# Patient Record
Sex: MALE | Race: OTHER | URBAN - METROPOLITAN AREA
[De-identification: names, ages, dates, MRNs, and addresses within clinical notes are randomized per-mention and may not be internally consistent; named-entity substitution may affect disease eponyms.]

---

## 2024-05-15 ENCOUNTER — EMERGENCY (EMERGENCY)
Facility: HOSPITAL | Age: 59
LOS: 1 days | Discharge: HOME | End: 2024-05-15
Attending: EMERGENCY MEDICINE | Admitting: EMERGENCY MEDICINE
Payer: COMMERCIAL

## 2024-05-15 ENCOUNTER — APPOINTMENT (OUTPATIENT)
Dept: INTERNAL MEDICINE | Facility: CLINIC | Age: 59
End: 2024-05-15

## 2024-05-15 VITALS
HEART RATE: 68 BPM | RESPIRATION RATE: 18 BRPM | TEMPERATURE: 98 F | SYSTOLIC BLOOD PRESSURE: 124 MMHG | OXYGEN SATURATION: 100 % | DIASTOLIC BLOOD PRESSURE: 78 MMHG

## 2024-05-15 VITALS
RESPIRATION RATE: 18 BRPM | DIASTOLIC BLOOD PRESSURE: 84 MMHG | OXYGEN SATURATION: 97 % | TEMPERATURE: 99 F | SYSTOLIC BLOOD PRESSURE: 133 MMHG | HEART RATE: 81 BPM

## 2024-05-15 LAB
ALBUMIN SERPL ELPH-MCNC: 3.8 G/DL — SIGNIFICANT CHANGE UP (ref 3.3–5)
ALP SERPL-CCNC: 86 U/L — SIGNIFICANT CHANGE UP (ref 40–120)
ALT FLD-CCNC: 43 U/L — HIGH (ref 4–41)
ANION GAP SERPL CALC-SCNC: 17 MMOL/L — HIGH (ref 7–14)
AST SERPL-CCNC: 42 U/L — HIGH (ref 4–40)
BASOPHILS # BLD AUTO: 0.02 K/UL — SIGNIFICANT CHANGE UP (ref 0–0.2)
BASOPHILS NFR BLD AUTO: 0.3 % — SIGNIFICANT CHANGE UP (ref 0–2)
BILIRUB SERPL-MCNC: 0.4 MG/DL — SIGNIFICANT CHANGE UP (ref 0.2–1.2)
BUN SERPL-MCNC: 16 MG/DL — SIGNIFICANT CHANGE UP (ref 7–23)
CALCIUM SERPL-MCNC: 9.8 MG/DL — SIGNIFICANT CHANGE UP (ref 8.4–10.5)
CHLORIDE SERPL-SCNC: 101 MMOL/L — SIGNIFICANT CHANGE UP (ref 98–107)
CO2 SERPL-SCNC: 19 MMOL/L — LOW (ref 22–31)
CREAT SERPL-MCNC: 0.8 MG/DL — SIGNIFICANT CHANGE UP (ref 0.5–1.3)
CRP SERPL-MCNC: 102.8 MG/L — HIGH
EGFR: 104 ML/MIN/1.73M2 — SIGNIFICANT CHANGE UP
EOSINOPHIL # BLD AUTO: 0.06 K/UL — SIGNIFICANT CHANGE UP (ref 0–0.5)
EOSINOPHIL NFR BLD AUTO: 0.8 % — SIGNIFICANT CHANGE UP (ref 0–6)
ERYTHROCYTE [SEDIMENTATION RATE] IN BLOOD: 96 MM/HR — HIGH (ref 1–15)
GLUCOSE SERPL-MCNC: 117 MG/DL — HIGH (ref 70–99)
HCT VFR BLD CALC: 37.4 % — LOW (ref 39–50)
HGB BLD-MCNC: 13 G/DL — SIGNIFICANT CHANGE UP (ref 13–17)
IANC: 5.14 K/UL — SIGNIFICANT CHANGE UP (ref 1.8–7.4)
IMM GRANULOCYTES NFR BLD AUTO: 0.1 % — SIGNIFICANT CHANGE UP (ref 0–0.9)
LYMPHOCYTES # BLD AUTO: 1.5 K/UL — SIGNIFICANT CHANGE UP (ref 1–3.3)
LYMPHOCYTES # BLD AUTO: 20.7 % — SIGNIFICANT CHANGE UP (ref 13–44)
MCHC RBC-ENTMCNC: 31.9 PG — SIGNIFICANT CHANGE UP (ref 27–34)
MCHC RBC-ENTMCNC: 34.8 GM/DL — SIGNIFICANT CHANGE UP (ref 32–36)
MCV RBC AUTO: 91.9 FL — SIGNIFICANT CHANGE UP (ref 80–100)
MONOCYTES # BLD AUTO: 0.52 K/UL — SIGNIFICANT CHANGE UP (ref 0–0.9)
MONOCYTES NFR BLD AUTO: 7.2 % — SIGNIFICANT CHANGE UP (ref 2–14)
NEUTROPHILS # BLD AUTO: 5.14 K/UL — SIGNIFICANT CHANGE UP (ref 1.8–7.4)
NEUTROPHILS NFR BLD AUTO: 70.9 % — SIGNIFICANT CHANGE UP (ref 43–77)
NRBC # BLD: 0 /100 WBCS — SIGNIFICANT CHANGE UP (ref 0–0)
NRBC # FLD: 0 K/UL — SIGNIFICANT CHANGE UP (ref 0–0)
PLATELET # BLD AUTO: 299 K/UL — SIGNIFICANT CHANGE UP (ref 150–400)
POTASSIUM SERPL-MCNC: 4.1 MMOL/L — SIGNIFICANT CHANGE UP (ref 3.5–5.3)
POTASSIUM SERPL-SCNC: 4.1 MMOL/L — SIGNIFICANT CHANGE UP (ref 3.5–5.3)
PROT SERPL-MCNC: 8 G/DL — SIGNIFICANT CHANGE UP (ref 6–8.3)
RBC # BLD: 4.07 M/UL — LOW (ref 4.2–5.8)
RBC # FLD: 11.7 % — SIGNIFICANT CHANGE UP (ref 10.3–14.5)
SODIUM SERPL-SCNC: 137 MMOL/L — SIGNIFICANT CHANGE UP (ref 135–145)
URATE SERPL-MCNC: 6.3 MG/DL — SIGNIFICANT CHANGE UP (ref 3.4–8.8)
WBC # BLD: 7.25 K/UL — SIGNIFICANT CHANGE UP (ref 3.8–10.5)
WBC # FLD AUTO: 7.25 K/UL — SIGNIFICANT CHANGE UP (ref 3.8–10.5)

## 2024-05-15 PROCEDURE — 73600 X-RAY EXAM OF ANKLE: CPT | Mod: 26,RT

## 2024-05-15 PROCEDURE — 99285 EMERGENCY DEPT VISIT HI MDM: CPT

## 2024-05-15 PROCEDURE — 70498 CT ANGIOGRAPHY NECK: CPT | Mod: 26,MC

## 2024-05-15 PROCEDURE — 70496 CT ANGIOGRAPHY HEAD: CPT | Mod: 26,MC

## 2024-05-15 PROCEDURE — 71046 X-RAY EXAM CHEST 2 VIEWS: CPT | Mod: 26

## 2024-05-15 RX ORDER — IBUPROFEN 200 MG
400 TABLET ORAL ONCE
Refills: 0 | Status: COMPLETED | OUTPATIENT
Start: 2024-05-15 | End: 2024-05-15

## 2024-05-15 RX ADMIN — Medication 400 MILLIGRAM(S): at 16:22

## 2024-05-15 RX ADMIN — Medication 40 MILLIGRAM(S): at 18:59

## 2024-05-15 NOTE — ED ADULT NURSE NOTE - NSFALLASSESSNEED_ED_ALL_ED
----- Message from Lakshmi Phillip MA sent at 8/31/2023  2:28 PM CDT -----  Regarding: FW: Repatha  FYI  ----- Message -----  From: Mita Bennett Chi, PharmD  Sent: 8/31/2023  10:46 AM CDT  To: Valentin CHAWLA Staff  Subject: Repatha                                          OSP unable to contact patient for Repatha refills and closing enrollment.    Mita Aguilar  Clinical Pharmacist  [facility]  Phone: [phone]  Fax: [phone]  Hours: Monday - Friday 8:30 - 5:00 PM         no

## 2024-05-15 NOTE — ED PROVIDER NOTE - ATTENDING CONTRIBUTION TO CARE
Gong: I have seen and examined the patient face to face, have reviewed and addended the HPI, PE and a/p as necessary.     57 yo M with no PMH, has not seen a doctor in years a/w right ankle swelling.  Noted to have R ankle swelling is progressed over the past few days. Pt also reprots having bilateral knee pain.  No trauma to any of these areas.  Endorsing redness to lateral aspect of ankle.  Denies any bug bites.  States he thinks he might of had gout in the past. Of note on ROS patient noted to have increasing weakness and loss of mm mass of R fore arm and L quadriceps mm.  Pt reports weight loss of 20lbs over the past few years.  Of note on ROS, patient reports having 1 week of numbness and cramping of R hand 2 weeks ago.   Denies any fevers, chills, chest pain, shortness of breath, nausea vomiting diarrhea.     GEN - NAD; well appearing; A+O x3; non-toxic appearing  CARD -s1s2, RRR, no M,G,R;   PULM - CTA b/l, symmetric breath sounds;   ABD -  +BS, ND, NT, soft, no guarding, no rebound, no masses;   BACK - no CVA tenderness, Normal  spine;   EXT - symmetric pulses, 2+ dp, capillary refill < 2 seconds, no cyanosis, no edema; RUE forearm with loss of mm mass; R ankle eythematous on the lateral mall with swelling and unable to bear weight.  L quadriceps with loss of mm mass  NEURO - R hand with decreased  strength     57 yo M with no PMH, has not seen a doctor in years a/w right ankle swelling. Concern for possible gout flare however, cannot explain loss of mm mass in quadriceps and R forearm.  Will obtain ct head to eval for possible ischemic condition, may require further testing,  Neurology and podiatry consults.  Dispo possible admission for further work up.

## 2024-05-15 NOTE — ED PROVIDER NOTE - CLINICAL SUMMARY MEDICAL DECISION MAKING FREE TEXT BOX
58year-old male no significant past medical history presenting with right ankle swelling.  Patient states swelling is progressed over the past few days.  Also endorsing bilateral knee pain.  No trauma to any of these areas.  Endorsing redness to lateral aspect of ankle.  Denies any bug bites.  States he thinks he might of had gout in the past.  Denies any fevers, chills, chest pain, shortness of breath, nausea vomiting diarrhea.  Endorsing subjective weight loss and to 2006. States he has never seen a primary care physician before  Plan for basic labs including CBC CMP ESR CRP uric acid level  Differential diagnose includes but not limited to gout versus pseudogout versus cellulitis.  Low suspicion for septic joint as patient has full range of motion and is afebrile

## 2024-05-15 NOTE — ED PROVIDER NOTE - OBJECTIVE STATEMENT
EXTR-year-old male no significant past medical history presenting with right ankle swelling.  Patient states swelling is progressed over the past few days.  Also endorsing bilateral knee pain.  No trauma to any of these areas.  Endorsing redness to lateral aspect of ankle.  Denies any bug bites.  States he thinks he might of had gout in the past.  Denies any fevers, chills, chest pain, shortness of breath, nausea vomiting diarrhea.  Endorsing subjective weight loss and to 2006. States he has never seen a primary care physician before 58-year-old male no significant past medical history presenting with right ankle swelling.  Patient states swelling is progressed over the past few days.  Also endorsing bilateral knee pain.  No trauma to any of these areas.  Endorsing redness to lateral aspect of ankle. Endorsing intermittent numbness to bilateral hands and weakness with  strength. Denies any bug bites.  States he thinks he might of had gout in the past.  Denies any fevers, chills, chest pain, shortness of breath, nausea vomiting diarrhea.  Endorsing subjective weight loss and to 2006. States he has never seen a primary care physician before

## 2024-05-15 NOTE — ED PROVIDER NOTE - CARE PLAN
1 Principal Discharge DX:	Ankle pain  Secondary Diagnosis:	Muscle atrophy   Principal Discharge DX:	Muscle atrophy  Secondary Diagnosis:	Gout  Secondary Diagnosis:	Ankle pain

## 2024-05-15 NOTE — ED PROVIDER NOTE - CARE PROVIDER_API CALL
Heather Nunez [place]  Neurology  [address] [place] [address]  Phone: [phone]  Fax: [phone]  Follow Up Time:    Heather Nunez  Neurology  [address] [facility], [facility] [address]  [place] [address]  Phone: [phone]  Fax: [phone]  Follow Up Time:     Mac Shelton  Podiatric Medicine and Surgery  [address] [place] [id number]  Phone: [phone]  Fax: [phone]  Follow Up Time:

## 2024-05-15 NOTE — ED ADULT TRIAGE NOTE - CHIEF COMPLAINT QUOTE
Pt. c/o right ankle pain, swelling and redness since Friday. Also c/o bilateral knee pain. Denies injury. States he's been losing a lot of weight.

## 2024-05-15 NOTE — CONSULT NOTE ADULT - ASSESSMENT
58y M with Hx gout, presenting w/ CC of R ankle pain. Neurology consulted for areas of muscle atrophy    Impression: episodes of R wrist and L knee swelling, followed by muscle atrophy. DDx mononeuropathy multiplex, spinal nerve impingement    Recommendations:   [] f/u CTA head/neck w/ contrast  [] outpatient EMG and NCS  [] follow-up with Dr Pitts in 2-4 weeks ([phone])    Case discussed w/ attending Dr Nunez 58y M with Hx gout, presenting w/ CC of R ankle pain. Neurology consulted for areas of muscle atrophy    Impression: episodes of R wrist and L knee swelling, followed by muscle atrophy. DDx mononeuropathy multiplex, spinal nerve impingement    Recommendations:   [] outpatient EMG and NCS  [] follow-up with Dr Pitts in 1 week ([phone])    Case discussed w/ attending Dr Nunez    Attending Attestation:  The patient left the hospital prior to being seen by me.

## 2024-05-15 NOTE — ED PROVIDER NOTE - CARE PROVIDERS DIRECT ADDRESSES
[email or web address]

## 2024-05-15 NOTE — ED PROVIDER NOTE - PROVIDER TOKENS
PROVIDER:[TOKEN:[[id number]]] PROVIDER:[TOKEN:[[id number]]],PROVIDER:[TOKEN:[[id number]]]

## 2024-05-15 NOTE — ED PROVIDER NOTE - PHYSICAL EXAMINATION
Jean Garrett DO (PGY2)   Physical Exam:    Gen: NAD, AOx3  Head: NCAT  HEENT: EOMI, PEERLA  Lung: CTAB, no respiratory distress, no wheezes/rhonchi/rales B/L  CV: RRR, no murmurs, rubs or gallops  Abd: soft, NT, ND, no guarding, no rigidity, no rebound tenderness, no CVA tenderness   MSK: Right ankle with overlying ecchymosis to lateral aspect, with tenderness to palpation.  +2 pulses with full range of motion of right foot and right ankle.  Sensation intact to light touch bilateral lower extremities.  No tenderness to palpation or swelling to bilateral knees.  Left ankle and foot within normal limits  Neuro: No focal sensory or motor deficits. Sensation intact to light touch all extremities.  Skin: Warm, well perfused, no rash, no leg swelling  Psych: normal affect, calm Jean Garrett DO (PGY2)   Physical Exam:    Gen: NAD, AOx3  Head: NCAT  HEENT: EOMI, PEERLA  Lung: CTAB, no respiratory distress, no wheezes/rhonchi/rales B/L  CV: RRR, no murmurs, rubs or gallops  Abd: soft, NT, ND, no guarding, no rigidity, no rebound tenderness, no CVA tenderness   MSK: Right ankle with overlying ecchymosis to lateral aspect, with tenderness to palpation.  +2 pulses with full range of motion of right foot and right ankle.  Sensation intact to light touch bilateral lower extremities.  No tenderness to palpation or swelling to bilateral knees.  Left ankle and foot within normal limits  Neuro: Patient with 3 out of 5  strength right upper extremity, 5 out of 5  strength left upper extremity.    Evidence of muscle wasting to right forearm with  as well as left thigh wasting with extension  Cranial Nerves:  --CN II: PERRL 3mm  --CN III, IV, VI: EOMI bilateral no nystagmus  --CN V1-3: Facial sensation intact to touch  --CN VII: No facial asymmetry or droop  --CN VIII: Hearing intact to rubbing fingers b/l  --CN IX, X: Palate elevates symmetrically. Normal phonation  --CN XI: Heading turning and shoulder shrug intact b/l  --CN XII: Tongue midline with normal movements  Skin: Warm, well perfused

## 2024-05-15 NOTE — ED ADULT NURSE NOTE - OBJECTIVE STATEMENT
Attempted to call patient to follow up on bp readings. Left voicemail.     c/o R ankle pain/swelling x 5 days. denies injury/trauma. endorses recent unplanned weightloss and generalized body/joint pain. denies cp/sob. denies n/v/d/headache/fever/chills. RR even and unlabored, speaking in complete sentences, aao x 3. will cont to monitor.

## 2024-05-15 NOTE — CONSULT NOTE ADULT - ASSESSMENT
58M presents with right ankle swelling and pain   - Patient seen and evaluated  - Afebrile, No leukocytosis, ESR 96, CRP 10.28, Uric Acid 6.3  - No open lesion bilaterally. Erythema localized to right lateral ankle with no fluctuance, no increased warmth. Pain upon palpation of right lateral ankle, decreased strength of foot evertors 4/5 bilaterally, ability to fully range ankle joint bilaterally   - Right ankle xray: no OM, no gas, mild soft tissue swelling   - Low concern for septic joint as ability to fully range motion of ankle with mild pain   - Recommend IV prednisone   - Recommend Medrol dose pack upon discharge   - Patient to follow up within 1 week with Dr. Shelton ([phone])  - Discussed with attending

## 2024-05-15 NOTE — CONSULT NOTE ADULT - SUBJECTIVE AND OBJECTIVE BOX
Patient is a 58y old  Male who presents with a chief complaint of gout (15 May 2024 18:03)      HPI:  58 year-old male past medical history of HTN, HLD, and Alport syndrome presenting with right ankle swelling.  Patient states swelling is progressed over the past few days.  Also endorsing bilateral knee pain.  No trauma to any of these areas.  Endorsing redness to lateral aspect of ankle.  Denies any bug bites.  States he thinks he might of had gout in the past.  Denies any fevers, chills, chest pain, shortness of breath, nausea vomiting diarrhea.  Endorsing subjective weight loss and to 2006. States he has never seen a primary care physician before    PAST MEDICAL & SURGICAL HISTORY:      MEDICATIONS  (STANDING):  methylPREDNISolone sodium succinate Injectable 40 milliGRAM(s) IV Push Once    MEDICATIONS  (PRN):      Allergies    No Known Allergies    Intolerances        VITALS:    Vital Signs Last 24 Hrs  T(C): 36.7 (15 May 2024 18:31), Max: 37.1 (15 May 2024 15:38)  T(F): 98 (15 May 2024 18:31), Max: 98.7 (15 May 2024 15:38)  HR: 68 (15 May 2024 18:31) (68 - 81)  BP: 124/78 (15 May 2024 18:31) (124/78 - 133/84)  BP(mean): --  RR: 18 (15 May 2024 18:31) (18 - 18)  SpO2: 100% (15 May 2024 18:31) (97% - 100%)    Parameters below as of 15 May 2024 18:31  Patient On (Oxygen Delivery Method): room air        LABS:                          13.0   7.25  )-----------( 299      ( 15 May 2024 16:20 )             37.4       05-15    137  |  101  |  16  ----------------------------<  117<H>  4.1   |  19<L>  |  0.80    Ca    9.8      15 May 2024 16:20    TPro  8.0  /  Alb  3.8  /  TBili  0.4  /  DBili  x   /  AST  42<H>  /  ALT  43<H>  /  AlkPhos  86  05-15      CAPILLARY BLOOD GLUCOSE              LOWER EXTREMITY PHYSICAL EXAM:    Vascular: DP/PT 2/4, B/L, CFT <3 seconds B/L, Temperature gradient warm to cool, B/L.   Neuro: Epicritic sensation diminished to the level of digits, B/L.  Musculoskeletal/Ortho: pain upon palpation of right lateral ankle, decreased strength of foot evertors 4/5 bilaterally, ability to fully range ankle joint bilaterally   Skin: No open lesion bilaterally. Erythema localized to right lateral ankle with no fluctuance, no increased warmth      RADIOLOGY & ADDITIONAL STUDIES:    < from: Xray Ankle 2 Views, Right (05.15.24 @ 17:29) >    ACC: [id number] EXAM:  XR ANKLE 2 VIEWS RT   ORDERED BY: ELANA CAT     PROCEDURE DATE:  05/15/2024          INTERPRETATION:  CLINICAL INDICATION: right ankle pain and swelling    EXAM:  Frontal oblique lateral right ankle from 5/15/2024 at 1729. No similar   prior studies available for comparison.    IMPRESSION:  Mild soft tissue swelling. No tracking soft tissue gas collections,   radiopaque foreign bodies, or gross radiographic evidence for   osteomyelitis.    No fractures or dislocations.    Congruent ankle mortise with smooth and intact talar dome. Preserved   remaining visualized joint spaces and no joint margin erosions.    Tiny calcaneal enthesophytes.    No lytic or blastic lesions.    --- End of Report ---            ROBINSON STARKS MD; Attending Radiologist  This document has been electronically signed. May 15 2024  5:34PM    < end of copied text >  
Neurology - Consult Note    -  Spectra: [id number] ([facility]), [phone] ([facility])  -    HPI: Patient INDRA SIBLEY is a 58y (1965) man with a PMHx significant for gout, who presents w/ CC of R ankle pain. Sister on phone providing [place] translation as needed. 4 weeks ago, he had swelling of the R wrist and inability to move the R hand for 2 weeks. He thinks he was using the RUE less during that time. He noticed afterwards muscle loss in the R forearm. 1 week ago, he noticed swelling in the L knee, followed by muscle loss in the L thigh. 5 days ago, he noticed swelling, redness, and pain of the R ankle. He denies any blurry vision, diplopia, slurred speech, dysphagia, SOB. No focal numbness. No family history of neurological disease. He describes a prior history of R hand weakness about 2 years ago that lasted for 1 week and self-resolved. He denies neck or back pain.      Review of Systems:     CONSTITUTIONAL: No fevers or chills  EYES AND ENT: No visual changes or no throat pain   NECK: No pain or stiffness  RESPIRATORY: No hemoptysis or shortness of breath  CARDIOVASCULAR: No chest pain or palpitations  GASTROINTESTINAL: No melena or hematochezia  GENITOURINARY: No dysuria or hematuria  NEUROLOGICAL: +As stated in HPI above  SKIN: No itching, burning, rashes, or lesions   All other review of systems is negative unless indicated above.    Allergies:  No Known Allergies      PMHx/PSHx/Family Hx: As above, otherwise see below       Social Hx:  No current use of tobacco, alcohol, or illicit drugs    Medications:  MEDICATIONS  (STANDING):    MEDICATIONS  (PRN):      Vitals:  T(C): 37.1 (05-15-24 @ 15:38), Max: 37.1 (05-15-24 @ 15:38)  HR: 81 (05-15-24 @ 15:38) (81 - 81)  BP: 133/84 (05-15-24 @ 15:38) (133/84 - 133/84)  RR: 18 (05-15-24 @ 15:38) (18 - 18)  SpO2: 97% (05-15-24 @ 15:38) (97% - 97%)    Physical Examination:   General - NAD, pleasant cooperative male  Cardiovascular - Peripheral pulses palpable, no edema  Eyes - Fundoscopy not performed due to safety precautions in the setting of the COVID-19 pandemic    Neurologic Exam:  Mental status - Awake, Alert, Oriented to person. Speech fluent. Follows simple and complex commands. Attention/concentration, recent and remote memory (including registration and recall), and fund of knowledge intact    Cranial nerves - PERRLA, VFF, EOMI, face sensation (V1-V3) intact b/l, facial strength intact without asymmetry b/l, hearing intact b/l, palate with symmetric elevation, trapezius 5/5 strength b/l, tongue midline on protrusion    Motor - Normal tone throughout. 5/5 strength in b/l deltoids, biceps, triceps, hand , finger flexors/abductors, hip flexion, knee extension, ankle dorsi/plantarflexion. Muscle atrophy in the R forearm and L thigh    Sensation - Light touch intact throughout    DTR's -             Biceps      Triceps     Brachioradialis      Patellar    Ankle    Toes/plantar response  R             2+             2+                  2+                       0            0                 Down  L              2+             2+                 2+                        0           0                 Down    Coordination - No tremors appreciated    Gait and station - did not assess due to R ankle pain    Labs:                        13.0   7.25  )-----------( 299      ( 15 May 2024 16:20 )             37.4     05-15    137  |  101  |  16  ----------------------------<  117<H>  4.1   |  19<L>  |  0.80    Ca    9.8      15 May 2024 16:20    TPro  8.0  /  Alb  3.8  /  TBili  0.4  /  DBili  x   /  AST  42<H>  /  ALT  43<H>  /  AlkPhos  86  05-15    CAPILLARY BLOOD GLUCOSE        LIVER FUNCTIONS - ( 15 May 2024 16:20 )  Alb: 3.8 g/dL / Pro: 8.0 g/dL / ALK PHOS: 86 U/L / ALT: 43 U/L / AST: 42 U/L / GGT: x                           Radiology:  imaging pending

## 2024-05-15 NOTE — ED PROVIDER NOTE - NSFOLLOWUPINSTRUCTIONS_ED_ALL_ED_FT
- You were seen in the emergency department today for Ankle pain  You were sent steroids to the pharmacy, please take as directed  Please follow-up with the neurology doctor as well as a podiatrist.  You will be given the numbers for both    - Lab and imaging results, if performed, were discussed with you along with your discharge diagnosis    - Follow up with your doctor in 1 week - bring copies of your results if you were given. If you are supposed  to follow up with a specialist, please bring your results with you as well.     - Return to the ED for any new, worsening, or concerning symptoms to you    - Continue all prescribed medications.    - Take ibuprofen/tylenol as directed as needed for pain.     - Rest and keep yourself hydrated with fluids. - You were seen in the emergency department today for Ankle pain  You were sent steroids to the pharmacy, please take as directed  Please follow-up with the neurology doctor as well as a podiatrist.  You will be given the numbers for both    Please follow up with the podiatrist: Call to make an appointment   Dr. Shelton ([phone])    - Lab and imaging results, if performed, were discussed with you along with your discharge diagnosis    - Follow up with your doctor in 1 week - bring copies of your results if you were given. If you are supposed  to follow up with a specialist, please bring your results with you as well.     - Return to the ED for any new, worsening, or concerning symptoms to you    - Continue all prescribed medications.    - Take ibuprofen/tylenol as directed as needed for pain.     - Rest and keep yourself hydrated with fluids. - You were seen in the emergency department today for Ankle pain  You were sent steroids to the pharmacy, please take as directed  Please follow-up with the neurology doctor as well as a podiatrist.  You will be given the numbers for both    Neurology: Dr Flaco Pitts  [address] [facility] [address] [facility] [id number] [phone]    Please follow up with the podiatrist: Call to make an appointment   Dr. Shelton ([phone])  Podiatric Medicine and Surgery  [address] [place] [phone]    - Lab and imaging results, if performed, were discussed with you along with your discharge diagnosis    - Follow up with your doctor in 1 week - bring copies of your results if you were given. If you are supposed  to follow up with a specialist, please bring your results with you as well.     - Return to the ED for any new, worsening, or concerning symptoms to you    - Continue all prescribed medications.    - Take ibuprofen/tylenol as directed as needed for pain.     - Rest and keep yourself hydrated with fluids.

## 2024-05-15 NOTE — ED ADULT NURSE REASSESSMENT NOTE - GENERAL PATIENT STATE
Spoke with pt-verbalizes understanding of results and f/u with GI. Advised pt that culture result is still pending. pt eval for c/o rt ankle pain with swelling since friday. + swelling noted to rt outer malleous/comfortable appearance/smiling/interactive

## 2024-05-15 NOTE — ED ADULT NURSE NOTE - NSFALLHARMRISKINTERV_ED_ALL_ED

## 2024-05-15 NOTE — ED PROVIDER NOTE - PROGRESS NOTE DETAILS
Jean Garrett DO (PGY2)  Patient ordered for CTA head and neck to evaluate for central process in regards to muscle wasting and decreased strength.  Neurology consulted for evaluation Jean Garrett DO (PGY2)  Patient with elevated CRP at 102.  Uric acid within normal limits.  Will consult podiatry for evaluation of concerns of septic joint versus gout.  Neurology at bedside for evaluation Jean Garrett DO (PGY2)  Neurology evaluated the patient, patient still pending CTA head and neck however if nonactionable.  States follow-up with outpatient neurologist for nerve conduction studies and EMS.  Podiatry has evaluated the patient.  Do not think that the patient has a septic joint as he is ranging his leg and ankle, does not have a fever or white count.  Recommending giving 1 dose of steroid and discharged on a Medrol Dosepak. LAWANDA Adler: CTA not acutely actionable, pt reassessed and stable, will D/C with outpatient Neurology and Podiatry follow up. LAWANDA Adler: CTA not acutely actionable, pt reassessed and notes improved sx s/p solumedrol, will D/C with outpatient Neurology and Podiatry follow up.

## 2024-05-28 ENCOUNTER — APPOINTMENT (OUTPATIENT)
Dept: INTERNAL MEDICINE | Facility: CLINIC | Age: 59
End: 2024-05-28
Payer: COMMERCIAL

## 2024-05-28 ENCOUNTER — NON-APPOINTMENT (OUTPATIENT)
Age: 59
End: 2024-05-28

## 2024-05-28 VITALS
SYSTOLIC BLOOD PRESSURE: 120 MMHG | WEIGHT: 186 LBS | HEART RATE: 65 BPM | DIASTOLIC BLOOD PRESSURE: 71 MMHG | HEIGHT: 64.96 IN | TEMPERATURE: 98.3 F | OXYGEN SATURATION: 97 % | BODY MASS INDEX: 30.99 KG/M2

## 2024-05-28 DIAGNOSIS — Z00.00 ENCOUNTER FOR GENERAL ADULT MEDICAL EXAMINATION W/OUT ABNORMAL FINDINGS: ICD-10-CM

## 2024-05-28 DIAGNOSIS — M25.50 PAIN IN UNSPECIFIED JOINT: ICD-10-CM

## 2024-05-28 DIAGNOSIS — I83.819 VARICOSE VEINS OF UNSPECIFIED LOWER EXTREMITY WITH PAIN: ICD-10-CM

## 2024-05-28 DIAGNOSIS — R21 RASH AND OTHER NONSPECIFIC SKIN ERUPTION: ICD-10-CM

## 2024-05-28 PROCEDURE — 99386 PREV VISIT NEW AGE 40-64: CPT

## 2024-05-28 PROCEDURE — 93000 ELECTROCARDIOGRAM COMPLETE: CPT

## 2024-05-28 PROCEDURE — 36415 COLL VENOUS BLD VENIPUNCTURE: CPT

## 2024-05-29 NOTE — HISTORY OF PRESENT ILLNESS
[de-identified] : This is annual Preventative examination for this 58 year  old male also here to establish care.  The patient has been generally feeling well with c/o multi joint pain. He went to a podiatrist recently and wants him to have a uric acid and sed rate to determine if he has gout but he has no redness or swelling. He was given steroids last week.    A complete evaluation of their current medication was reviewed with them including OTC medication .  Chronic medical problems for which they are being followed by a physician are: none      Exercises regularly:  A complete Review of Systems is below but it is noteworthy to mention that this patient denies Chest Pain, Dyspnea on Exertion, Palpitations, urinary problems, rectal bleeding or Gastrointestinal problems at this time.  Used to drink a lot as well, gave it up last month

## 2024-05-29 NOTE — ASSESSMENT
[FreeTextEntry1] : This is a 58 year -old  M who was examined today for an annual preventative physical.  A complete history and physical examination were performed.  The patient seems to follow a healthy lifestyle in that he  follows a good diet and exercises regularly.    Physical examination was entirely within normal limits , including a normal blood pressure and pulse.     The following recommendations were made: Exercise regularly. Maintain a healthy diet. _____________________________________________________  will plan for venous doppler  fu with ortho  fu with derm   will do labs

## 2024-05-29 NOTE — PHYSICAL EXAM
[No Acute Distress] : no acute distress [Well Nourished] : well nourished [Well Developed] : well developed [Well-Appearing] : well-appearing (V5) oriented [Normal Sclera/Conjunctiva] : normal sclera/conjunctiva [PERRL] : pupils equal round and reactive to light [EOMI] : extraocular movements intact [Normal Outer Ear/Nose] : the outer ears and nose were normal in appearance [Normal Oropharynx] : the oropharynx was normal [No JVD] : no jugular venous distention [No Lymphadenopathy] : no lymphadenopathy [Supple] : supple [Thyroid Normal, No Nodules] : the thyroid was normal and there were no nodules present [No Respiratory Distress] : no respiratory distress  [No Accessory Muscle Use] : no accessory muscle use [Clear to Auscultation] : lungs were clear to auscultation bilaterally [Normal Rate] : normal rate  [Regular Rhythm] : with a regular rhythm [Normal S1, S2] : normal S1 and S2 [No Murmur] : no murmur heard [No Carotid Bruits] : no carotid bruits [No Abdominal Bruit] : a ~M bruit was not heard ~T in the abdomen [No Varicosities] : no varicosities [Pedal Pulses Present] : the pedal pulses are present [No Edema] : there was no peripheral edema [No Palpable Aorta] : no palpable aorta [No Extremity Clubbing/Cyanosis] : no extremity clubbing/cyanosis [Soft] : abdomen soft [Non Tender] : non-tender [Non-distended] : non-distended [No Masses] : no abdominal mass palpated [No HSM] : no HSM [Normal Bowel Sounds] : normal bowel sounds [Normal Posterior Cervical Nodes] : no posterior cervical lymphadenopathy [Normal Anterior Cervical Nodes] : no anterior cervical lymphadenopathy [No CVA Tenderness] : no CVA  tenderness [No Spinal Tenderness] : no spinal tenderness [No Joint Swelling] : no joint swelling [Grossly Normal Strength/Tone] : grossly normal strength/tone [No Rash] : no rash [Coordination Grossly Intact] : coordination grossly intact [No Focal Deficits] : no focal deficits [Normal Gait] : normal gait [Deep Tendon Reflexes (DTR)] : deep tendon reflexes were 2+ and symmetric [Normal Affect] : the affect was normal [Normal Insight/Judgement] : insight and judgment were intact Azelaic Acid Counseling: Patient counseled that medicine may cause skin irritation and to avoid applying near the eyes.  In the event of skin irritation, the patient was advised to reduce the amount of the drug applied or use it less frequently.   The patient verbalized understanding of the proper use and possible adverse effects of azelaic acid.  All of the patient's questions and concerns were addressed.

## 2024-06-05 LAB
25(OH)D3 SERPL-MCNC: 10.8 NG/ML
ALBUMIN SERPL ELPH-MCNC: 4.3 G/DL
ALP BLD-CCNC: 96 U/L
ALT SERPL-CCNC: 41 U/L
ANA SER IF-ACNC: NEGATIVE
ANION GAP SERPL CALC-SCNC: 13 MMOL/L
APPEARANCE: CLEAR
AST SERPL-CCNC: 33 U/L
BACTERIA: NEGATIVE /HPF
BASOPHILS # BLD AUTO: 0.05 K/UL
BASOPHILS NFR BLD AUTO: 0.7 %
BILIRUB SERPL-MCNC: 0.2 MG/DL
BILIRUBIN URINE: NEGATIVE
BLOOD URINE: NEGATIVE
BUN SERPL-MCNC: 19 MG/DL
CALCIUM SERPL-MCNC: 9.7 MG/DL
CAST: 0 /LPF
CHLORIDE SERPL-SCNC: 107 MMOL/L
CHOLEST SERPL-MCNC: 281 MG/DL
CO2 SERPL-SCNC: 23 MMOL/L
COLOR: YELLOW
CREAT SERPL-MCNC: 0.86 MG/DL
EGFR: 102 ML/MIN/1.73M2
EOSINOPHIL # BLD AUTO: 0.19 K/UL
EOSINOPHIL NFR BLD AUTO: 2.5 %
EPITHELIAL CELLS: 0 /HPF
ERYTHROCYTE [SEDIMENTATION RATE] IN BLOOD BY WESTERGREN METHOD: 66 MM/HR
ESTIMATED AVERAGE GLUCOSE: 143 MG/DL
FERRITIN SERPL-MCNC: 748 NG/ML
FOLATE SERPL-MCNC: 14.6 NG/ML
GLUCOSE QUALITATIVE U: NEGATIVE MG/DL
GLUCOSE SERPL-MCNC: 92 MG/DL
HBA1C MFR BLD HPLC: 6.6 %
HCT VFR BLD CALC: 39 %
HDLC SERPL-MCNC: 39 MG/DL
HGB BLD-MCNC: 12.9 G/DL
IMM GRANULOCYTES NFR BLD AUTO: 0.4 %
IRON SATN MFR SERPL: 19 %
IRON SERPL-MCNC: 51 UG/DL
KETONES URINE: NEGATIVE MG/DL
LDLC SERPL CALC-MCNC: 194 MG/DL
LEUKOCYTE ESTERASE URINE: NEGATIVE
LYMPHOCYTES # BLD AUTO: 2.26 K/UL
LYMPHOCYTES NFR BLD AUTO: 29.9 %
MAN DIFF?: NORMAL
MCHC RBC-ENTMCNC: 31.7 PG
MCHC RBC-ENTMCNC: 33.1 GM/DL
MCV RBC AUTO: 95.8 FL
MICROSCOPIC-UA: NORMAL
MONOCYTES # BLD AUTO: 0.54 K/UL
MONOCYTES NFR BLD AUTO: 7.2 %
NEUTROPHILS # BLD AUTO: 4.48 K/UL
NEUTROPHILS NFR BLD AUTO: 59.3 %
NITRITE URINE: NEGATIVE
NONHDLC SERPL-MCNC: 242 MG/DL
PH URINE: 5.5
PLATELET # BLD AUTO: 230 K/UL
POTASSIUM SERPL-SCNC: 4 MMOL/L
PROT SERPL-MCNC: 7.1 G/DL
PROTEIN URINE: 100 MG/DL
PSA SERPL-MCNC: 0.95 NG/ML
RBC # BLD: 4.07 M/UL
RBC # FLD: 12.5 %
RED BLOOD CELLS URINE: 1 /HPF
RHEUMATOID FACT SER QL: <10 IU/ML
SODIUM SERPL-SCNC: 143 MMOL/L
SPECIFIC GRAVITY URINE: 1.02
T4 SERPL-MCNC: 7.2 UG/DL
TIBC SERPL-MCNC: 274 UG/DL
TRIGL SERPL-MCNC: 246 MG/DL
TSH SERPL-ACNC: 2.03 UIU/ML
UIBC SERPL-MCNC: 224 UG/DL
URATE SERPL-MCNC: 7.8 MG/DL
UROBILINOGEN URINE: 0.2 MG/DL
VIT B12 SERPL-MCNC: 444 PG/ML
WBC # FLD AUTO: 7.55 K/UL
WHITE BLOOD CELLS URINE: 1 /HPF

## 2024-06-11 ENCOUNTER — APPOINTMENT (OUTPATIENT)
Dept: ORTHOPEDIC SURGERY | Facility: CLINIC | Age: 59
End: 2024-06-11
Payer: COMMERCIAL

## 2024-06-11 VITALS
DIASTOLIC BLOOD PRESSURE: 72 MMHG | HEART RATE: 61 BPM | HEIGHT: 66 IN | SYSTOLIC BLOOD PRESSURE: 135 MMHG | BODY MASS INDEX: 29.89 KG/M2 | WEIGHT: 186 LBS

## 2024-06-11 DIAGNOSIS — M62.461 CONTRACTURE OF MUSCLE, RIGHT LOWER LEG: ICD-10-CM

## 2024-06-11 DIAGNOSIS — M62.89 OTHER SPECIFIED DISORDERS OF MUSCLE: ICD-10-CM

## 2024-06-11 DIAGNOSIS — M72.2 PLANTAR FASCIAL FIBROMATOSIS: ICD-10-CM

## 2024-06-11 DIAGNOSIS — M62.462 CONTRACTURE OF MUSCLE, LEFT LOWER LEG: ICD-10-CM

## 2024-06-11 DIAGNOSIS — M77.8 OTHER ENTHESOPATHIES, NOT ELSEWHERE CLASSIFIED: ICD-10-CM

## 2024-06-11 PROCEDURE — 99203 OFFICE O/P NEW LOW 30 MIN: CPT

## 2024-06-11 PROCEDURE — 73630 X-RAY EXAM OF FOOT: CPT | Mod: RT

## 2024-06-12 PROBLEM — M72.2 PLANTAR FASCIA SYNDROME: Status: ACTIVE | Noted: 2024-06-12

## 2024-06-12 PROBLEM — M62.89 TIGHTNESS OF BOTH GASTROCNEMIUS MUSCLES: Status: ACTIVE | Noted: 2024-06-12

## 2024-06-12 PROBLEM — M62.462 GASTROCNEMIUS EQUINUS OF LEFT LOWER EXTREMITY: Status: ACTIVE | Noted: 2024-06-11

## 2024-07-02 ENCOUNTER — APPOINTMENT (OUTPATIENT)
Dept: DERMATOLOGY | Facility: CLINIC | Age: 59
End: 2024-07-02

## 2024-07-09 ENCOUNTER — APPOINTMENT (OUTPATIENT)
Dept: GASTROENTEROLOGY | Facility: CLINIC | Age: 59
End: 2024-07-09

## 2024-07-17 ENCOUNTER — APPOINTMENT (OUTPATIENT)
Dept: DERMATOLOGY | Facility: CLINIC | Age: 59
End: 2024-07-17
Payer: COMMERCIAL

## 2024-07-17 VITALS — WEIGHT: 194 LBS | BODY MASS INDEX: 31.31 KG/M2

## 2024-07-17 DIAGNOSIS — L71.9 ROSACEA, UNSPECIFIED: ICD-10-CM

## 2024-07-17 DIAGNOSIS — L85.3 XEROSIS CUTIS: ICD-10-CM

## 2024-07-17 PROCEDURE — 99204 OFFICE O/P NEW MOD 45 MIN: CPT

## 2024-07-17 RX ORDER — METRONIDAZOLE 7.5 MG/G
0.75 CREAM TOPICAL TWICE DAILY
Qty: 1 | Refills: 2 | Status: ACTIVE | COMMUNITY
Start: 2024-07-17 | End: 1900-01-01

## 2025-01-22 NOTE — ED PROVIDER NOTE - IV ALTEPLASE ADMIN OUTSIDE HIDDEN
Recommended; [facility]    Sun Protection    Ultraviolet light from the sun is responsible for inducing sunburn, photoaging (wrinkles, discoloration), and skin cancer.    Photoprotection is crucial to prevent or reduce the potential harms associated with UV exposure.  All individuals, regardless of skin color are subject to the effects of the sun.     1) Minimize sun exposure, especially during the middle of the day (between 10 am and 3 pm) when the UV intensity is greatest.   2) Wear sun-protective clothing when outside:  A hat, long sleeved shirt, long pants and sunglasses.  Clothing with ultraviolet protection factor (UPF) is another way to prevent sun damage.  3) Liberally apply SPF 30 sunscreen or higher, use a broad spectrum sunscreen that covers both UVA and UVB.   Reapply sunscreen every 2 hours, or after sweating or swimming.     Heliocare is an oral antioxidant supplement that helps to prevent sunburn, it should be used with sunscreen.   http://www[employer]    Perform skin self exams, return to the dermatology office if you notice anything new or changing on your skin.     For more information visit: http://www.skincancer.org/prevention/sun-protection             Shave Biopsy Wound Care Instructions    After your biopsy, minimize activity of the affected area for the remainder of the day if possible.  Minimize straining, strenuous activity, bending, and lifting.  If you have discomfort, use Tylenol every 4-6 hours.  Do not take aspirin, ibuprofen, or ibuprofen-like products for pain relief, they may cause increased bleeding.  Minor swelling after a biopsy is expected.  Use ice-packs 2-3 times a day for 10-15 minutes if it occurs.   Keep the bandage applied in the Dermatology Clinic in place for 24 hours. The surgical site needs to be kept completely DRY during this time to optimize healing.  After the first 24 hours, you may gently rinse the surgical wound with mild soap and water.  Let the soap run over  the wound - no scrubbing!   Change the bandage daily for 7 days (apply a thin layer of Vaseline or Aquaphor on the surgical site, then put the bandage on)  After day 7, no more bandage or Vaseline is necessary.  If BLEEDING occurs, apply continuous gentle pressure with a clean, dry washcloth or gauze for 15 minutes. DO NOT check the site during these 15 minutes.  IF there is still bleeding after that time repeat this step.  If bleeding continues, call our office immediately.  Some redness and swelling is expected.  This does not require treatment and will resolve in 1-2 weeks.  If the area should become very SORE or RED, if there is DRAINAGE, or if you have a FEVER, call our office immediately.  If you have bleeding that will not stop, or are worried about infection, please call the office: [phone]       show

## 2025-09-18 ENCOUNTER — APPOINTMENT (OUTPATIENT)
Dept: INTERNAL MEDICINE | Facility: CLINIC | Age: 60
End: 2025-09-18
Payer: COMMERCIAL

## 2025-09-18 VITALS
HEART RATE: 77 BPM | SYSTOLIC BLOOD PRESSURE: 152 MMHG | WEIGHT: 196 LBS | OXYGEN SATURATION: 97 % | BODY MASS INDEX: 31.5 KG/M2 | HEIGHT: 66 IN | TEMPERATURE: 98.4 F | DIASTOLIC BLOOD PRESSURE: 84 MMHG

## 2025-09-18 DIAGNOSIS — Z00.00 ENCOUNTER FOR GENERAL ADULT MEDICAL EXAMINATION W/OUT ABNORMAL FINDINGS: ICD-10-CM

## 2025-09-18 PROCEDURE — 93000 ELECTROCARDIOGRAM COMPLETE: CPT

## 2025-09-18 PROCEDURE — 36415 COLL VENOUS BLD VENIPUNCTURE: CPT

## 2025-09-18 PROCEDURE — 99396 PREV VISIT EST AGE 40-64: CPT

## 2025-09-19 DIAGNOSIS — E78.5 HYPERLIPIDEMIA, UNSPECIFIED: ICD-10-CM

## 2025-09-19 LAB
25(OH)D3 SERPL-MCNC: 18.3 NG/ML
ALBUMIN SERPL ELPH-MCNC: 4.5 G/DL
ALP BLD-CCNC: 77 U/L
ALT SERPL-CCNC: 51 U/L
ANION GAP SERPL CALC-SCNC: 16 MMOL/L
APPEARANCE: CLEAR
AST SERPL-CCNC: 49 U/L
BACTERIA: NEGATIVE /HPF
BASOPHILS # BLD AUTO: 0.05 K/UL
BASOPHILS NFR BLD AUTO: 0.6 %
BILIRUB SERPL-MCNC: 0.3 MG/DL
BILIRUBIN URINE: NEGATIVE
BLOOD URINE: NEGATIVE
BUN SERPL-MCNC: 24 MG/DL
CALCIUM SERPL-MCNC: 9.9 MG/DL
CAST: 0 /LPF
CHLORIDE SERPL-SCNC: 102 MMOL/L
CHOLEST SERPL-MCNC: 338 MG/DL
CO2 SERPL-SCNC: 20 MMOL/L
COLOR: YELLOW
CREAT SERPL-MCNC: 0.88 MG/DL
EGFRCR SERPLBLD CKD-EPI 2021: 100 ML/MIN/1.73M2
EOSINOPHIL # BLD AUTO: 0.17 K/UL
EOSINOPHIL NFR BLD AUTO: 2.2 %
EPITHELIAL CELLS: 0 /HPF
ESTIMATED AVERAGE GLUCOSE: 140 MG/DL
GLUCOSE QUALITATIVE U: NEGATIVE MG/DL
GLUCOSE SERPL-MCNC: 115 MG/DL
HBA1C MFR BLD HPLC: 6.5 %
HCT VFR BLD CALC: 43.9 %
HDLC SERPL-MCNC: 56 MG/DL
HGB BLD-MCNC: 15 G/DL
IMM GRANULOCYTES NFR BLD AUTO: 0.4 %
KETONES URINE: NEGATIVE MG/DL
LDLC SERPL-MCNC: 211 MG/DL
LEUKOCYTE ESTERASE URINE: NEGATIVE
LYMPHOCYTES # BLD AUTO: 2.15 K/UL
LYMPHOCYTES NFR BLD AUTO: 27.7 %
MAN DIFF?: NORMAL
MCHC RBC-ENTMCNC: 32.8 PG
MCHC RBC-ENTMCNC: 34.2 G/DL
MCV RBC AUTO: 95.9 FL
MICROSCOPIC-UA: NORMAL
MONOCYTES # BLD AUTO: 0.67 K/UL
MONOCYTES NFR BLD AUTO: 8.6 %
NEUTROPHILS # BLD AUTO: 4.69 K/UL
NEUTROPHILS NFR BLD AUTO: 60.5 %
NITRITE URINE: NEGATIVE
NONHDLC SERPL-MCNC: 281 MG/DL
PH URINE: 5.5
PLATELET # BLD AUTO: 195 K/UL
POTASSIUM SERPL-SCNC: 4.4 MMOL/L
PROT SERPL-MCNC: 7.8 G/DL
PROTEIN URINE: 300 MG/DL
PSA SERPL-MCNC: 0.79 NG/ML
RBC # BLD: 4.58 M/UL
RBC # FLD: 13.8 %
RED BLOOD CELLS URINE: 1 /HPF
SODIUM SERPL-SCNC: 138 MMOL/L
SPECIFIC GRAVITY URINE: 1.02
T4 SERPL-MCNC: 6.7 UG/DL
TRIGL SERPL-MCNC: 342 MG/DL
TSH SERPL-ACNC: 1.32 UIU/ML
URATE SERPL-MCNC: 9.2 MG/DL
UROBILINOGEN URINE: 0.2 MG/DL
WBC # FLD AUTO: 7.76 K/UL
WHITE BLOOD CELLS URINE: 0 /HPF

## 2025-09-19 RX ORDER — ATORVASTATIN CALCIUM 20 MG/1
20 TABLET, FILM COATED ORAL
Qty: 30 | Refills: 5 | Status: ACTIVE | COMMUNITY
Start: 2025-09-19 | End: 1900-01-01